# Patient Record
Sex: MALE | Race: BLACK OR AFRICAN AMERICAN | NOT HISPANIC OR LATINO
[De-identification: names, ages, dates, MRNs, and addresses within clinical notes are randomized per-mention and may not be internally consistent; named-entity substitution may affect disease eponyms.]

---

## 2024-11-18 ENCOUNTER — DASHBOARD ENCOUNTERS (OUTPATIENT)
Age: 54
End: 2024-11-18

## 2024-11-18 ENCOUNTER — OFFICE VISIT (OUTPATIENT)
Dept: URBAN - METROPOLITAN AREA CLINIC 94 | Facility: CLINIC | Age: 54
End: 2024-11-18
Payer: COMMERCIAL

## 2024-11-18 VITALS
WEIGHT: 315 LBS | TEMPERATURE: 98.1 F | BODY MASS INDEX: 36.45 KG/M2 | HEIGHT: 78 IN | SYSTOLIC BLOOD PRESSURE: 158 MMHG | HEART RATE: 76 BPM | DIASTOLIC BLOOD PRESSURE: 89 MMHG

## 2024-11-18 DIAGNOSIS — K21.9 CHRONIC GERD: ICD-10-CM

## 2024-11-18 DIAGNOSIS — K59.09 CHRONIC CONSTIPATION: ICD-10-CM

## 2024-11-18 PROBLEM — 235595009: Status: ACTIVE | Noted: 2024-11-18

## 2024-11-18 PROCEDURE — 99204 OFFICE O/P NEW MOD 45 MIN: CPT | Performed by: INTERNAL MEDICINE

## 2024-11-18 RX ORDER — METOPROLOL SUCCINATE 25 MG/1
TAKE 1 TABLET (25 MG) BY MOUTH DAILY DO NOT CRUSH OR CHEW TABLET, EXTENDED RELEASE ORAL
Qty: 30 EACH | Refills: 5 | Status: ACTIVE | COMMUNITY

## 2024-11-18 RX ORDER — ASPIRIN 81 MG/1
1 TABLET TABLET, COATED ORAL ONCE A DAY
Status: ACTIVE | COMMUNITY

## 2024-11-18 RX ORDER — VENLAFAXINE HYDROCHLORIDE 75 MG/1
TAKE ONE CAPSULE BY MOUTH ONE TIME DAILY FOR MOOD CAPSULE, EXTENDED RELEASE ORAL
Qty: 30 EACH | Refills: 0 | Status: ACTIVE | COMMUNITY

## 2024-11-18 RX ORDER — ATORVASTATIN CALCIUM 80 MG/1
TAKE 1 TABLET BY MOUTH EVERY DAY TABLET, FILM COATED ORAL
Qty: 30 EACH | Refills: 5 | Status: ACTIVE | COMMUNITY

## 2024-11-18 RX ORDER — OMEPRAZOLE 40 MG/1
1 CAPSULE 30 MINUTES BEFORE MORNING MEAL CAPSULE, DELAYED RELEASE ORAL ONCE A DAY
Qty: 90 | Refills: 3 | OUTPATIENT
Start: 2024-11-18

## 2024-11-18 RX ORDER — ISOSORBIDE MONONITRATE 60 MG/1
TAKE 1 TABLET (60 MG) BY MOUTH ONCE DAILY. DO NOT CRUSH OR CHEW TABLET, EXTENDED RELEASE ORAL
Qty: 30 EACH | Refills: 5 | Status: ACTIVE | COMMUNITY

## 2024-11-18 NOTE — HPI-TODAY'S VISIT:
53 YR male presents for evaluation of GERD and constipation Chronic constipation for several years Average 2-3 BM's per week Abdominal bloating, gas and cramping Heartburn with acid reflux few times per week Denies dysphagia, abdominal pain, N/V, rectal bleeding or weight loss -States that colonoscopy 2023 reportedly showed some small benign polyps, no report available Denies EGD in the past. - History of heart disease, sees cardiology on regular basis

## 2025-02-20 ENCOUNTER — OFFICE VISIT (OUTPATIENT)
Dept: URBAN - METROPOLITAN AREA CLINIC 94 | Facility: CLINIC | Age: 55
End: 2025-02-20

## 2025-02-20 RX ORDER — NIFEDIPINE 30 MG/1
1 TABLET ON AN EMPTY STOMACH TABLET, EXTENDED RELEASE ORAL ONCE A DAY
Status: ACTIVE | COMMUNITY

## 2025-02-20 RX ORDER — ISOSORBIDE MONONITRATE 60 MG/1
TAKE 1 TABLET (60 MG) BY MOUTH ONCE DAILY. DO NOT CRUSH OR CHEW TABLET, EXTENDED RELEASE ORAL
Qty: 30 EACH | Refills: 5 | Status: ACTIVE | COMMUNITY

## 2025-02-20 RX ORDER — METOPROLOL SUCCINATE 25 MG/1
TAKE 1 TABLET (25 MG) BY MOUTH DAILY DO NOT CRUSH OR CHEW TABLET, EXTENDED RELEASE ORAL
Qty: 30 EACH | Refills: 5 | COMMUNITY

## 2025-02-20 RX ORDER — VENLAFAXINE HYDROCHLORIDE 75 MG/1
TAKE ONE CAPSULE BY MOUTH ONE TIME DAILY FOR MOOD CAPSULE, EXTENDED RELEASE ORAL
Qty: 30 EACH | Refills: 0 | Status: ACTIVE | COMMUNITY

## 2025-02-20 RX ORDER — ATORVASTATIN CALCIUM 80 MG/1
TAKE 1 TABLET BY MOUTH EVERY DAY TABLET, FILM COATED ORAL
Qty: 30 EACH | Refills: 5 | Status: ACTIVE | COMMUNITY

## 2025-02-20 RX ORDER — OMEPRAZOLE 40 MG/1
1 CAPSULE 30 MINUTES BEFORE MORNING MEAL CAPSULE, DELAYED RELEASE ORAL ONCE A DAY
Qty: 90 | Refills: 3 | Status: ACTIVE | COMMUNITY
Start: 2024-11-18

## 2025-02-20 RX ORDER — ASPIRIN 81 MG/1
1 TABLET TABLET, COATED ORAL ONCE A DAY
Status: ACTIVE | COMMUNITY

## 2025-03-13 ENCOUNTER — OFFICE VISIT (OUTPATIENT)
Dept: URBAN - METROPOLITAN AREA CLINIC 94 | Facility: CLINIC | Age: 55
End: 2025-03-13